# Patient Record
Sex: MALE | Race: WHITE | Employment: FULL TIME | URBAN - METROPOLITAN AREA
[De-identification: names, ages, dates, MRNs, and addresses within clinical notes are randomized per-mention and may not be internally consistent; named-entity substitution may affect disease eponyms.]

---

## 2018-02-12 ENCOUNTER — TRANSFERRED RECORDS (OUTPATIENT)
Dept: HEALTH INFORMATION MANAGEMENT | Facility: CLINIC | Age: 28
End: 2018-02-12

## 2018-02-27 ENCOUNTER — TRANSFERRED RECORDS (OUTPATIENT)
Dept: HEALTH INFORMATION MANAGEMENT | Facility: CLINIC | Age: 28
End: 2018-02-27

## 2018-05-07 ENCOUNTER — TRANSFERRED RECORDS (OUTPATIENT)
Dept: HEALTH INFORMATION MANAGEMENT | Facility: CLINIC | Age: 28
End: 2018-05-07

## 2018-05-15 ENCOUNTER — TRANSFERRED RECORDS (OUTPATIENT)
Dept: HEALTH INFORMATION MANAGEMENT | Facility: CLINIC | Age: 28
End: 2018-05-15
Payer: COMMERCIAL

## 2018-05-29 ENCOUNTER — TRANSFERRED RECORDS (OUTPATIENT)
Dept: HEALTH INFORMATION MANAGEMENT | Facility: CLINIC | Age: 28
End: 2018-05-29

## 2018-09-26 ENCOUNTER — TRANSFERRED RECORDS (OUTPATIENT)
Dept: HEALTH INFORMATION MANAGEMENT | Facility: CLINIC | Age: 28
End: 2018-09-26

## 2019-04-22 ENCOUNTER — TRANSFERRED RECORDS (OUTPATIENT)
Dept: HEALTH INFORMATION MANAGEMENT | Facility: CLINIC | Age: 29
End: 2019-04-22

## 2021-06-24 ENCOUNTER — TELEPHONE (OUTPATIENT)
Dept: AUDIOLOGY | Facility: CLINIC | Age: 31
End: 2021-06-24

## 2021-06-30 ENCOUNTER — MEDICAL CORRESPONDENCE (OUTPATIENT)
Dept: HEALTH INFORMATION MANAGEMENT | Facility: CLINIC | Age: 31
End: 2021-06-30

## 2021-07-01 ENCOUNTER — TRANSCRIBE ORDERS (OUTPATIENT)
Dept: OTHER | Age: 31
End: 2021-07-01

## 2021-07-01 DIAGNOSIS — Z96.21 USES COCHLEAR IMPLANT: ICD-10-CM

## 2021-07-01 DIAGNOSIS — H91.91 HEARING LOSS, RIGHT: Primary | ICD-10-CM

## 2021-07-02 ENCOUNTER — TELEPHONE (OUTPATIENT)
Dept: AUDIOLOGY | Facility: CLINIC | Age: 31
End: 2021-07-02
Payer: COMMERCIAL

## 2021-07-02 NOTE — TELEPHONE ENCOUNTER
[facility] Call Center    Phone Message    May a detailed message be left on voicemail: no     Reason for Call: Appointment Intake    Referring Provider Name: Jonathan Neumann MD at [facility]    Diagnosis and/or Symptoms: Continuation of care - needs cochlear implant follow-up. Patient is relocating to [place]. History of right-sided implantation in [place], [place]. Cochlear implant placed 2018 at [facility], [place]    Records in [facility]    Action Taken: Message routed to:  [facility] ([facility]): CLINIC COORDINATORS-[id number] [[id number]] - per protocols    Travel Screening: Not Applicable

## 2021-07-06 ENCOUNTER — TELEPHONE (OUTPATIENT)
Dept: AUDIOLOGY | Facility: CLINIC | Age: 31
End: 2021-07-06

## 2021-07-14 ENCOUNTER — TELEPHONE (OUTPATIENT)
Dept: AUDIOLOGY | Facility: CLINIC | Age: 31
End: 2021-07-14

## 2021-07-14 NOTE — TELEPHONE ENCOUNTER
Patient requesting to transfer CI care. Request for Medical records, surgery report, and cochlear Implant programing file faxed to [phone]- [facility], [place].

## 2021-07-27 ENCOUNTER — TELEPHONE (OUTPATIENT)
Dept: AUDIOLOGY | Facility: CLINIC | Age: 31
End: 2021-07-27

## 2021-08-05 ENCOUNTER — OFFICE VISIT (OUTPATIENT)
Dept: AUDIOLOGY | Facility: CLINIC | Age: 31
End: 2021-08-05
Payer: COMMERCIAL

## 2021-08-05 DIAGNOSIS — H90.3 SENSORINEURAL HEARING LOSS, ASYMMETRICAL: Primary | ICD-10-CM

## 2021-08-05 PROCEDURE — 92626 EVAL AUD FUNCJ 1ST HOUR: CPT | Performed by: AUDIOLOGIST

## 2021-08-05 PROCEDURE — V5014 HEARING AID REPAIR/MODIFYING: HCPCS | Performed by: AUDIOLOGIST

## 2021-08-05 PROCEDURE — 92567 TYMPANOMETRY: CPT | Mod: 52 | Performed by: AUDIOLOGIST

## 2021-08-05 NOTE — PROGRESS NOTES
AUDIOLOGY REPORT    BACKGROUND INFORMATION: Patient was implanted by Dr Placido Ball (at [facility]) with a  Cochlear Americas implant SN# [id number] on 5/16/18 in his right ear due to a bilateral sensorineural hearing loss and lack of benefit from hearing aids. The patient's initial activation on the right ear was 6/19/2018. He currently uses a Cochlear Americas N7 processor. He uses a left ReSound Linx Quattro hearing aid. He reports that he has used amplification continuously from about 3 years of age, he reports that he suffered from severe Jaundice shortly after birth which may be a factor in his hearing loss. He has a diagnosis of cerebral palsy.    The patient is being seen to establish care in this clinic for his cochlear implant on 8/5/2021 in the Audiology Clinic at the [facility].     A third year audiology student was present for today's appointment.    PATIENT REPORT: He reports that the embedded  for his left hearing aid is broke, he is currently using a back-up earmold but notes he is getting feedback.    He reports recent concerns of a possible left ear infection.    He has no concerns regarding his cochlear implant at this time. He would like to get the software updated in both is primary and back-up processor.    FITTING SESSION: Jonathan Neumann MD ordered today's appointment. The patient came to the clinic for adjustment to the programs in the external speech processor and for assessment of the external components of the cochlear implant system. These components provide power and data to the internal device. Sound is only heard once the external portion is activated. Postoperative treatment, including device fitting and adjustment, audiologic assessments, and training are required at regular intervals. Testing can include electropsychophysical measures of threshold, comfort, and loudness balancing, which are completed to update the  "program.    Processor type: Cochlear Americas N7  Magnet strength: Patient came in wearing #1, but changed to #1/2 per patient request as he reports #1 is too strong and he needs to \"undo\" the #1 magnet so it doesn't irritate his skin.    TEST RESULTS:   Otoscopy: Clear right ear canal, bloody drainage left ear canal.    Tympanograms: Normal mobility right, DNT left  Electrode Impedances: stable and within tolerances  Neural Response Testing: Present responses across the electrode array.  22 (178), 16 (176), 11 (193), 6 (188), 1 (148).  Facial Stimulation: Absent  Tinnitus: Absent  Balance Problems: Absent  Pain/Discomfort: Absent  Strategies Tried: ACE  Strategy Preference: ACE - Rate 720, PW 37    Programs: as read out from processor  1. Map 33 (SCAN)  2. Map 33 (standard antonio)  3. Map 33 (Music)  4. Map 34    Number of Channels per Program: 22    METHOD: Speech perception testing is conducted at regular intervals to determine the degree of benefit the patient is obtaining from the cochlear implant. Tests are conducted using the cochlear implant without the benefit of lipreading. All tests are conducted in a sound-treated room. Perception of monosyllabic words and words in sentences are tested. Results usually show improvement over time. A decrease in performance indicates the need for re-programming of the prosthesis and/or replacement of components.     Devices used for today's testing:Right Cochlear Americas N7 processor (Note that the left side with the Linx Quattro 7 hearing aid was not tested due to bloody drainage in the left ear)  Approximated 35 minutes of face to face contact was used to assess auditory rehabilitation status.     Aided Soundfield responses indicated responses in the normal to mild hearing loss range.     AzBio Sentences Test:  The patient repeats 20 sentences, auditory only.  The sentences are presented at 60 dB SPL (conversational level) delivered from a CD player.  Of note, a record " review was performed and there are no post-op scores for which to compare  Right CI: 84%      COMMENTS: The patient is receiving good audibility and speech understanding with his cochlear implant. No changes were made tot he programming of his cochlear implant today.    The patient's embedded  earmold is still under warranty and will be sent to the  for repair. Due to the bloody drainage in his left ear, it was recommended that he not wear the left hearing aid unless absolutely necessary. His current earmold was cleaned as it had become filled with blood. He expressed understanding.    The left hearing aid was connected to the software and read out. The feedback test was performed with the spare earmold per patient request. No additional changes were made to the programming today.    It was recommended that he follow-up with ENT regarding the left ear as soon as possible and an appointment was scheduled prior to him leaving the clinic today.    SUMMARY AND RECOMMENDATIONS: Vlad was seen for cochlear implant programming today and to establish care as a hearing aid and cochlear implant patient in this clinic. He will return in about 3 weeks to  his repaired  and perform bilateral aided testing.  The patient is scheduled to follow-up with ENT next week regarding his left ear. Please call this clinic with questions regarding these results or recommendations.      Stephanie Wolfe, Buddy  Audiologist  [place] License  #[id number]

## 2021-08-06 NOTE — TELEPHONE ENCOUNTER
FUTURE VISIT INFORMATION      FUTURE VISIT INFORMATION:    Date: 2021    Time: 8:30AM    Location: [facility]  REFERRAL INFORMATION:    Referring provider:      Referring providers clinic:      Reason for visit/diagnosis  Non CI ear- blood drainage in left ear.     RECORDS REQUESTED FROM:       Clinic name Comments Records Status Imaging Status   [facility] [place] 2021 audio and note from Stephanie [facility]win [facility]    ENT [facility]   2021 note from Jonathan Neumann MD Care everywhere     [facility] [place]    [address] [place] [id number] [phone]   5/15/2018 COCHLEAR IMPLANT with Placido Ball MD   Care everywhere     [place] [facility]    [address] [place] [id number] [phone] 2018 note from Placido Ball MD   Care everywhere      Medicine imaging   images (fx. [phone]), 3/1/2018 CT Temporal Bone   2014 CT head    *trackin   Care everywhere  req 21, 8/10/21, 21 sent to [facility] Information Management  Release of Information  [address] [place] [address], [place] [address]  Phone: [phone]  Fax: [phone]    2021 8AM sent a fax to [facility] for images - Amay   8/10/2021 2:44PM tracked images, have not been sent. Sent a 2nd fax for images - Amay   2021 11:51AM left a voicemail with medical records, called hospital. Connected with radiology film room, film room provided direct fax for images (fx. [phone]), sent fax for images - Amay    * 21 1:39 PM Imaging disc received [facility] and sent to [facility] to be uploaded into PACs. - [place]

## 2021-08-16 NOTE — PATIENT INSTRUCTIONS
1. You were seen in the ENT Clinic today by Dr. Nissen.  If you have any questions or concerns after your appointment, please call   - Option 1: ENT Clinic: [phone]   - Option 2: Gabriela (Dr. Nissen's Nurse): [phone]                   Shannon(Dr. Nissen's Nurse): [phone]      2.   Plan to return to clinic in 1 year with Hearing test    Gabriela Hammonds LPN  [facility] - Otolaryngology

## 2021-08-17 ENCOUNTER — OFFICE VISIT (OUTPATIENT)
Dept: OTOLARYNGOLOGY | Facility: CLINIC | Age: 31
End: 2021-08-17
Payer: COMMERCIAL

## 2021-08-17 ENCOUNTER — PRE VISIT (OUTPATIENT)
Dept: OTOLARYNGOLOGY | Facility: CLINIC | Age: 31
End: 2021-08-17

## 2021-08-17 VITALS — HEART RATE: 90 BPM | TEMPERATURE: 97.8 F | WEIGHT: 134.48 LBS | OXYGEN SATURATION: 97 %

## 2021-08-17 DIAGNOSIS — H92.12 DRAINAGE FROM LEFT EAR: ICD-10-CM

## 2021-08-17 DIAGNOSIS — H90.3 BILATERAL SENSORINEURAL HEARING LOSS: Primary | ICD-10-CM

## 2021-08-17 PROCEDURE — 92504 EAR MICROSCOPY EXAMINATION: CPT | Performed by: OTOLARYNGOLOGY

## 2021-08-17 PROCEDURE — 99203 OFFICE O/P NEW LOW 30 MIN: CPT | Mod: 25 | Performed by: OTOLARYNGOLOGY

## 2021-08-17 RX ORDER — SUMATRIPTAN 25 MG/1
TABLET, FILM COATED ORAL
COMMUNITY
Start: 2021-06-11

## 2021-08-17 ASSESSMENT — PAIN SCALES - GENERAL: PAINLEVEL: NO PAIN (0)

## 2021-08-17 NOTE — LETTER
8/17/2021       RE: Vlad Rhoades  [address]  [facility] [address]     Dear Colleague,    Thank you for referring your patient, Vlad Rhoades, to the [facility] NOSE [facility]AT [facility]INIC [place] [facility]. Please see a copy of my visit note below.    Dear Dr. Gotti Ref-Primary, Physician:    I had the pleasure of meeting Vlad Rhoades in consultation today at the [facility] Clinic at your request.    CHIEF COMPLAINT: Ears    HISTORY OF PRESENT ILLNESS: Patient is a 31-year-old in today for initial visit and establishing care.  He has cerebral palsy and severe bilateral sensorineural hearing loss.  He wears a hearing aid in the left ear and has a cochlear implant in the right ear that was placed while living in [place].  He recently moved here from [place] where his parents live.  He is working remotely in [place] and leaving now in [place], establishing care here.  He recently had drainage from the left ear, he said blood-tinged.  He was treated in [place] and has moved here, comes in for follow-up.  No actual pain, has not noticed actual drainage from the ear.  He gets along well with the implant and hearing aid.  No dizziness.  Denies any dysphagia, hoarseness, facial paresthesias.  Has tinnitus but not problematic.    ALLERGIES:    Allergies   Allergen Reactions     Other Environmental Allergy Itching and Other (See Comments)     Other reaction(s): Wheezing/Bronchospasm (High)  Other reaction(s): Wheezing/Bronchospasm (High)     Seasonal Allergies Itching and Other (See Comments)     Other reaction(s): Wheezing/Bronchospasm (High)       HABITS: Social History    Substance and Sexual Activity      Alcohol use: Not on file        Comment: less than 1 drink per week     History   Smoking Status     Never Smoker   Smokeless Tobacco     Never Used         PAST MEDICAL HISTORY: Please see today's intake form (for the  remainder of the PMH) which I reviewed and signed.  History reviewed. No pertinent past medical history.    FAMILY HISTORY/SOCIAL HISTORY: History reviewed. No pertinent family history.   Social History     Socioeconomic History     Marital status: Single     Spouse name: Not on file     Number of children: Not on file     Years of education: Not on file     Highest education level: Not on file   Occupational History     Not on file   Tobacco Use     Smoking status: Never Smoker     Smokeless tobacco: Never Used   Substance and Sexual Activity     Alcohol use: Not on file     Comment: less than 1 drink per week     Drug use: Not on file     Sexual activity: Not on file   Other Topics Concern     Not on file   Social History Narrative     Not on file     Social Determinants of Health     Financial Resource Strain:      Difficulty of Paying Living Expenses:    Food Insecurity:      Worried About Running Out of Food in the Last Year:      Ran Out of Food in the Last Year:    Transportation Needs:      Lack of Transportation (Medical):      Lack of Transportation (Non-Medical):    Physical Activity:      Days of Exercise per Week:      Minutes of Exercise per Session:    Stress:      Feeling of Stress :    Social Connections:      Frequency of Communication with Friends and Family:      Frequency of Social Gatherings with Friends and Family:      Attends Rastafarian Services:      Active Member of Clubs or Organizations:      Attends Club or Organization Meetings:      Marital Status:    Intimate Partner Violence:      Fear of Current or Ex-Partner:      Emotionally Abused:      Physically Abused:      Sexually Abused:        REVIEW OF SYSTEMS: [unfilled]    The remainder of the 10 point ROS is negative    PHYSICIAL EXAMINATION:  Constitutional: The patient was well-groomed and in no acute distress.   Skin: Warm and pink.  Psychiatric: The patient's affect was calm, cooperative, and appropriate.   Respiratory: Breathing  comfortably without stridor or exertion of accessory muscles.  Eyes: Pupils were equal and reactive. Extraocular movement intact.   Head: Normocephalic and atraumatic. No lesions or scars.  Ears: Both ears examined does have mild drainage on the left side.  Is taken the microscope and under high-power magnification the right side was cleaned with curettes of cerumen.  Following cleaning, tympanic membrane and middle ear look normal.  Cochlear implant site looks well-healed and stable.  Left ear had mild debris which was cleaned with curette and suction.  He has a blood clot on the inferior canal.  Also has a small skin tag anterior lateral canal.  Not problematic palpation.  Nose: Sinuses were nontender. Anterior rhinoscopy revealed midline septum and absence of purulence or polyps.  Oral Cavity: Normal tongue, floor of mouth, buccal mucosa, and palate. No lesions or masses on inspection or palpation. No abnormal lymph tissue in the oropharynx.   Neck: The parotid is soft without masses. Supple with normal laryngeal and tracheal landmarks.   Lymphatic: There is no palpable lymphadenopathy or other masses in the neck.   Neurologic: Alert and oriented x 3. Cranial nerves III-XI within normal limits. Voice quality normal.  Cerebellar Function Tests:  Grossly normal    Audiogram: Audiogram performed with implant shows good response at about 30 dB through all frequencies.      IMPRESSION AND PLAN:   1. Bilateral sensorineural hearing loss: Continue to maximize benefit from cochlear implant on the right hearing aid on the left.  2. Left ear drainage: Essentially cleared, blood clot on anterior canal I left alone does not seem to be bothering, also monitor small Skin tag anterior canal.  Recommend follow-up in a year, sooner if any concerns    Thank you very much for the opportunity to participate in the care of your patient.    Rick L Nissen MD

## 2021-08-17 NOTE — NURSING NOTE
Chief Complaint   Patient presents with     Consult     blood drainge left ear    Pulse 90, temperature 97.8  F (36.6  C), weight 61 kg (134 lb 7.7 oz), SpO2 97 %.      Joe Ortega LPN

## 2021-08-17 NOTE — PROGRESS NOTES
Dear Dr. Gotti Ref-Primary, Physician:    I had the pleasure of meeting Vlad Rhoades in consultation today at the [facility] at your request.    CHIEF COMPLAINT: Ears    HISTORY OF PRESENT ILLNESS: Patient is a 31-year-old in today for initial visit and establishing care.  He has cerebral palsy and severe bilateral sensorineural hearing loss.  He wears a hearing aid in the left ear and has a cochlear implant in the right ear that was placed while living in [place].  He recently moved here from [place] where his parents live.  He is working remotely in [place] and leaving now in [place], establishing care here.  He recently had drainage from the left ear, he said blood-tinged.  He was treated in [place] and has moved here, comes in for follow-up.  No actual pain, has not noticed actual drainage from the ear.  He gets along well with the implant and hearing aid.  No dizziness.  Denies any dysphagia, hoarseness, facial paresthesias.  Has tinnitus but not problematic.    ALLERGIES:    Allergies   Allergen Reactions     Other Environmental Allergy Itching and Other (See Comments)     Other reaction(s): Wheezing/Bronchospasm (High)  Other reaction(s): Wheezing/Bronchospasm (High)     Seasonal Allergies Itching and Other (See Comments)     Other reaction(s): Wheezing/Bronchospasm (High)       HABITS: Social History    Substance and Sexual Activity      Alcohol use: Not on file        Comment: less than 1 drink per week     History   Smoking Status     Never Smoker   Smokeless Tobacco     Never Used         PAST MEDICAL HISTORY: Please see today's intake form (for the remainder of the PMH) which I reviewed and signed.  History reviewed. No pertinent past medical history.    FAMILY HISTORY/SOCIAL HISTORY: History reviewed. No pertinent family history.   Social History     Socioeconomic History     Marital status: Single     Spouse name: Not on file     Number of children: Not on file      Years of education: Not on file     Highest education level: Not on file   Occupational History     Not on file   Tobacco Use     Smoking status: Never Smoker     Smokeless tobacco: Never Used   Substance and Sexual Activity     Alcohol use: Not on file     Comment: less than 1 drink per week     Drug use: Not on file     Sexual activity: Not on file   Other Topics Concern     Not on file   Social History Narrative     Not on file     Social Determinants of Health     Financial Resource Strain:      Difficulty of Paying Living Expenses:    Food Insecurity:      Worried About Running Out of Food in the Last Year:      Ran Out of Food in the Last Year:    Transportation Needs:      Lack of Transportation (Medical):      Lack of Transportation (Non-Medical):    Physical Activity:      Days of Exercise per Week:      Minutes of Exercise per Session:    Stress:      Feeling of Stress :    Social Connections:      Frequency of Communication with Friends and Family:      Frequency of Social Gatherings with Friends and Family:      Attends Druze Services:      Active Member of Clubs or Organizations:      Attends Club or Organization Meetings:      Marital Status:    Intimate Partner Violence:      Fear of Current or Ex-Partner:      Emotionally Abused:      Physically Abused:      Sexually Abused:        REVIEW OF SYSTEMS: [unfilled]    The remainder of the 10 point ROS is negative    PHYSICIAL EXAMINATION:  Constitutional: The patient was well-groomed and in no acute distress.   Skin: Warm and pink.  Psychiatric: The patient's affect was calm, cooperative, and appropriate.   Respiratory: Breathing comfortably without stridor or exertion of accessory muscles.  Eyes: Pupils were equal and reactive. Extraocular movement intact.   Head: Normocephalic and atraumatic. No lesions or scars.  Ears: Both ears examined does have mild drainage on the left side.  Is taken the microscope and under high-power magnification the right  side was cleaned with curettes of cerumen.  Following cleaning, tympanic membrane and middle ear look normal.  Cochlear implant site looks well-healed and stable.  Left ear had mild debris which was cleaned with curette and suction.  He has a blood clot on the inferior canal.  Also has a small skin tag anterior lateral canal.  Not problematic palpation.  Nose: Sinuses were nontender. Anterior rhinoscopy revealed midline septum and absence of purulence or polyps.  Oral Cavity: Normal tongue, floor of mouth, buccal mucosa, and palate. No lesions or masses on inspection or palpation. No abnormal lymph tissue in the oropharynx.   Neck: The parotid is soft without masses. Supple with normal laryngeal and tracheal landmarks.   Lymphatic: There is no palpable lymphadenopathy or other masses in the neck.   Neurologic: Alert and oriented x 3. Cranial nerves III-XI within normal limits. Voice quality normal.  Cerebellar Function Tests:  Grossly normal    Audiogram: Audiogram performed with implant shows good response at about 30 dB through all frequencies.      IMPRESSION AND PLAN:   1. Bilateral sensorineural hearing loss: Continue to maximize benefit from cochlear implant on the right hearing aid on the left.  2. Left ear drainage: Essentially cleared, blood clot on anterior canal I left alone does not seem to be bothering, also monitor small Skin tag anterior canal.  Recommend follow-up in a year, sooner if any concerns    Thank you very much for the opportunity to participate in the care of your patient.    Rick L Nissen MD

## 2021-08-22 ENCOUNTER — HEALTH MAINTENANCE LETTER (OUTPATIENT)
Age: 31
End: 2021-08-22

## 2021-08-27 ENCOUNTER — OFFICE VISIT (OUTPATIENT)
Dept: AUDIOLOGY | Facility: CLINIC | Age: 31
End: 2021-08-27
Payer: COMMERCIAL

## 2021-08-27 DIAGNOSIS — H90.3 SENSORINEURAL HEARING LOSS, ASYMMETRICAL: Primary | ICD-10-CM

## 2021-08-27 PROCEDURE — 92557 COMPREHENSIVE HEARING TEST: CPT | Mod: 52 | Performed by: AUDIOLOGIST

## 2021-08-27 PROCEDURE — 92567 TYMPANOMETRY: CPT | Performed by: AUDIOLOGIST

## 2021-08-27 PROCEDURE — 92592 PR HEARING AID CHECK, MONAURAL: CPT | Performed by: AUDIOLOGIST

## 2021-08-27 NOTE — PROGRESS NOTES
AUDIOLOGY REPORT    BACKGROUND INFORMATION: Vlad Rhoades was seen in the Audiology Clinic at [facility] on 8/27/2021 for follow-up.  Patient was implanted by Dr Placido Ball (at [facility]) with a  Cochlear Americas implant SN# [id number] on 5/16/18 in his right ear due to a bilateral sensorineural hearing loss and lack of benefit from hearing aids. The patient's initial activation on the right ear was 6/19/2018. He currently uses a Cochlear Americas N7 processor. He uses a left ReSound Linx Quattro hearing aid. He reports that he has used amplification continuously from about 3 years of age, he reports that he suffered from severe Jaundice shortly after birth which may be a factor in his hearing loss. He has a diagnosis of cerebral palsy.    He was previously seen to establish care here for his cochlear implant on 8/5/2021. At that time his left earmold was found to be broken, he has been using a back up, and so the left earmold was sent in for repair. He is here today to  that earmold. Patient requests hearing evaluation as well.    TEST RESULTS AND PROCEDURES:   Otoscopic exam indicates ears are clear of cerumen bilaterally     Pure Tone Thresholds assessed using conventional audiometry with good  reliability from 250-8000 Hz bilaterally using insert earphones and circumaural headphones     RIGHT: Profound sensorineural hearing loss (no response at equipment limits)    LEFT:    moderate sloping to profound sensorineural hearing loss    Tympanogram:    RIGHT: Shallow mobility    LEFT:   Shallow mobility    Speech Reception Threshold:    RIGHT: Did not test    LEFT:   75 dB HL    Word Recognition Score:     RIGHT: Did not test    LEFT:   76% at 100 dB HL using NU-6 recorded word list.    The repaired embedded earmold was given to the patient, he did not want to change the earmolds today as he reports it is the exact same as his back-up (he had two made as he  reports he needs them repaired frequently.)  The hearing aid was checked, an electroacoustic check indicated that the device was functioning appropriately with no distortion or weakness noted. Simulated real-ear measures were performed and the hearing aid was found to fairly close to NAL-NL1 target gain. He reported he isn't hearing high frequencies as well with the hearing aid compared to his cochlear implant so gain in the high frequencies was increased, he also requested gain in the mid frequencies increased as well. Compression put on to help with the high frequencies but he did not like the sound quality so it was removed.    It was discussed that he will be hearing high frequencies better with his implant compared to the hearing aid which is expected given his hearing thresholds. He expressed understanding.  He had questions regarding a cochlear implant in the left ear, given his word recognition scores in the left ear it would be recommended he continue with hearing aid use.    SUMMARY AND RECOMMENDATIONS: A hearing aid follow-up and hearing test was performed today.  Adjustments were made as noted above and the patient will return annually for testing, sooner if concerns arise.  Call this clinic with questions regarding today s visit.      Buddy Louise  Audiologist  [place] License  #[id number]

## 2021-09-20 ENCOUNTER — TELEPHONE (OUTPATIENT)
Dept: AUDIOLOGY | Facility: CLINIC | Age: 31
End: 2021-09-20

## 2021-09-20 NOTE — TELEPHONE ENCOUNTER
Walk-in hearing aid services on 9/20/21: The patient stated his left ReSound embedded HAI mold was not functioning well.  It is being sent to the  for warranty (?) repair today.  The repaired mold will be mailed to the patient when it returns.  Mr. Rhoades was provided with a package of  filters and a new desiccant jar.  He was advised to return as needed.

## 2021-09-28 ENCOUNTER — TELEPHONE (OUTPATIENT)
Dept: AUDIOLOGY | Facility: CLINIC | Age: 31
End: 2021-09-28

## 2021-09-28 NOTE — TELEPHONE ENCOUNTER
The patient's repaired, left ReSound embedded HAI earmold (#[id number]) was mailed back to the patient today.  There is no charge as the device was under warranty.

## 2021-10-17 ENCOUNTER — HEALTH MAINTENANCE LETTER (OUTPATIENT)
Age: 31
End: 2021-10-17

## 2021-10-18 ENCOUNTER — MYC MEDICAL ADVICE (OUTPATIENT)
Dept: AUDIOLOGY | Facility: CLINIC | Age: 31
End: 2021-10-18

## 2021-10-20 NOTE — TELEPHONE ENCOUNTER
Alfred Chu,    I am sorry to hear that the microphone is malfunctioning. Unfortunately I do not have popexpert ReSound RITE devices at the moment and so you may just need to use the cochlear implant on its on for the few weeks that the device is in for repair.    You do not need an appointment to drop it off, you can just bring it to the clinic at your convenience and we can get it sent in for you.    Regards,  Stephanie Amado

## 2021-11-10 ENCOUNTER — OFFICE VISIT (OUTPATIENT)
Dept: AUDIOLOGY | Facility: CLINIC | Age: 31
End: 2021-11-10
Payer: COMMERCIAL

## 2021-11-10 DIAGNOSIS — H90.3 SENSORINEURAL HEARING LOSS, ASYMMETRICAL: Primary | ICD-10-CM

## 2021-11-10 PROCEDURE — 99207 PR ASSESSMENT FOR HEARING AID: CPT | Performed by: AUDIOLOGIST

## 2021-11-10 NOTE — PROGRESS NOTES
AUDIOLOGY REPORT    BACKGROUND INFORMATION: Vlad Rhoades was seen in the Audiology Clinic at [facility] on 11/10/2021 for follow-up.  Patient was implanted by Dr Placido Ball (at [facility]) with a  Cochlear [employer] implant SN# [id number] on 5/16/18 in his right ear due to a bilateral sensorineural hearing loss and lack of benefit from hearing aids. The patient's initial activation on the right ear was 6/19/2018. He currently uses a Cochlear [employer] N7 processor. He uses a left ReSound Linx Quattro hearing aid. He reports that he has used amplification continuously from about 3 years of age, he reports that he suffered from severe Jaundice shortly after birth which may be a factor in his hearing loss. He has a diagnosis of cerebral palsy.    He is here today as he reports excessive feedback from his hearing aid and to have everything re-paired so he can stream bimodally again.    TEST RESULTS AND PROCEDURES:   The hearing aid was connected to the software and the feedback manager was performed. The vent of the patient's earmold needed to be cleaned out as he had put a stick substance in the vent to stop the feedback. After the feedback manager was performed and the vent was cleaned he reported that the feedback issue was solved.    The hearing aid was linked to the patient's N7 processors. After connecting things, the hearing aid stopped producing sound. The hearing aid was restarted multiple times.  An attempt was made to reconnect the hearing aid to the software but it would not connect the software.  The hearing aid was unlinked from the cochlear implant sound processors and then tried to be connected back to the software but it kept reading out an error. A call to ReSound indicated that the hearing aid could be re-set. An attempt was made to re-set the hearing aid to factory settings but it would not connect to be able to do so.    It was confirmed with [employer] and ReSound that the correct software was installed with all the updates.    After 45 minutes of trying to re-set the hearing aid and get it connected,it was decided to re-send the device in for repair and just try again with a new device. The patient was in agreement with the plan.    The patient has a back-up Oticon hearing aid to use while the ReSound device is out for repair. The tubing was changed on the earmold for the patient to use.    SUMMARY AND RECOMMENDATIONS: A hearing aid check was performed today. The proper adjustments could not be completed due to technology errors with the software. The patient will return for an appointment on 11/19/21 to get everything re-paired and programmed correctly. Today's appointment is a no charge visit as he will be billed for the hearing aid check the day that he can leave with the hearing aid. Call this clinic with questions regarding today s visit.      Buddy Louise  Audiologist  [place] License  #[id number]

## 2021-11-19 ENCOUNTER — OFFICE VISIT (OUTPATIENT)
Dept: AUDIOLOGY | Facility: CLINIC | Age: 31
End: 2021-11-19
Payer: COMMERCIAL

## 2021-11-19 DIAGNOSIS — H90.3 SENSORINEURAL HEARING LOSS, ASYMMETRICAL: Primary | ICD-10-CM

## 2021-11-19 PROCEDURE — 99207 PR ASSESSMENT FOR HEARING AID: CPT | Performed by: AUDIOLOGIST

## 2021-11-19 NOTE — PROGRESS NOTES
AUDIOLOGY REPORT    BACKGROUND INFORMATION: Vlad Rhoades was seen in the Audiology Clinic at [facility] on 11/19/2021 for follow-up.  Patient was implanted by Dr Placido Ball (at [facility]) with a  Cochlear [employer] implant SN# [id number] on 5/16/18 in his right ear due to a bilateral sensorineural hearing loss and lack of benefit from hearing aids. The patient's initial activation on the right ear was 6/19/2018. He currently uses a Cochlear [employer] N7 processor. He uses a left ReSound Linx Quattro hearing aid. He reports that he has used amplification continuously from about 3 years of age, he reports that he suffered from severe Jaundice shortly after birth which may be a factor in his hearing loss. He has a diagnosis of cerebral palsy.    He is here today to have everything re-paired so he can stream bimodally again. Vlad was seen on 11/10/2021 and upon linking the hearing aid to the patient's N7 processors, the hearing aid stopped working. The hearing aid was sent in for repair. Today the repaired hearing aid will be re-paired to the patient's N7 processors.     TEST RESULTS AND PROCEDURES:   The repaired Resound hearing aid was connected to the fitting software and most recent settings were imported.     Pairing in the Cochlear software to pair the hearing aid and the N7 processor was attempted. However, when the hearing aid linked to the N7 processor in the software, the hearing aid stopped functioning again.     Cochlear [employer] was called and they reported this is a known issue with the Resound Quattro hearing aids where once the hearing aid is linked in the software, the circuit will mute the hearing aid. Once the hearing aid has been muted it cannot be unmuted without being sent in to the .     The hearing aid will be sent to Resound to repaired and for a firmware update inside the hearing aid.     SUMMARY AND RECOMMENDATIONS: The Resound  hearing aid will be sent in for repair again. The patient will be called to return to the clinic once the device is back from repair. Pairing will be re-attempted at this time. Today's appointment is a no charge visit.   Call this clinic with questions regarding today s visit.    Tonja Blevins M.A.   Audiology Doctoral Student #[id number]        I was present with the patient for the entire Audiology appointment including all procedures/testing performed by the AuD student, and agree with the student s assessment and plan as documented.      Stephanie Wolfe, Buddy  Audiologist  [place] License  #[id number]

## 2021-12-07 NOTE — PROGRESS NOTES
AUDIOLOGY REPORT    BACKGROUND INFORMATION: Vlad Rhoades was seen in the Audiology Clinic at [facility] on 12/8/2021 for follow-up.  Patient was implanted by Dr Placido Ball (at [facility]) with a  Cochlear [employer] implant SN# [id number] on 5/16/18 in his right ear due to a bilateral sensorineural hearing loss and lack of benefit from hearing aids. The patient's initial activation on the right ear was 6/19/2018. He currently uses a Cochlear [employer]s N7 processor. He uses a left ReSound Linx Quattro hearing aid. He reports that he has used amplification continuously from about 3 years of age, he reports that he suffered from severe Jaundice shortly after birth which may be a factor in his hearing loss. He has a diagnosis of cerebral palsy.    He is here today to have everything re-paired so he can stream bimodally again. Vlad was seen on 11/10/2021 and on 11/19/2021. At both visits upon linking the hearing aid to the patient's N7 processors, the hearing aid stopped working. Cochlear [employer]s was called and they reported this is a known issue with the Resound Quattro hearing aids where once the hearing aid is linked in the software, the circuit will mute the hearing aid.      TEST RESULTS AND PROCEDURES:   The repaired Resound hearing aid was connected to the fitting software and the firmware was upgraded  and most recent settings were imported.     Pairing in the Cochlear software to pair the hearing aid and the N7 processor was successful.    Feedback was noted when Vlad put on the left hearing aid. Feedback manager was re-run and following that feedback was not noted.     SUMMARY AND RECOMMENDATIONS: The patient's Resound Linx Quattro was successfully paired to his N7 processors. Today's appointment is a no charge visit.  Call this clinic with questions regarding today s visit.    Tonja Blevins M.A.   Audiology Doctoral Student #[id number]      I was  present with the patient for the entire Audiology appointment including all procedures/testing performed by the AuD student, and agree with the student s assessment and plan as documented.      Stephanie Wolfe, Buddy  Audiologist  [place] License  #[id number]

## 2021-12-08 ENCOUNTER — OFFICE VISIT (OUTPATIENT)
Dept: AUDIOLOGY | Facility: CLINIC | Age: 31
End: 2021-12-08
Payer: COMMERCIAL

## 2021-12-08 DIAGNOSIS — H90.3 SENSORINEURAL HEARING LOSS, ASYMMETRICAL: Primary | ICD-10-CM

## 2021-12-08 PROCEDURE — 99207 PR ASSESSMENT FOR HEARING AID: CPT | Performed by: AUDIOLOGIST

## 2022-01-24 ENCOUNTER — TELEPHONE (OUTPATIENT)
Dept: AUDIOLOGY | Facility: CLINIC | Age: 32
End: 2022-01-24
Payer: COMMERCIAL

## 2022-03-17 ENCOUNTER — TRANSFERRED RECORDS (OUTPATIENT)
Dept: HEALTH INFORMATION MANAGEMENT | Facility: CLINIC | Age: 32
End: 2022-03-17
Payer: COMMERCIAL

## 2022-03-18 ENCOUNTER — OFFICE VISIT (OUTPATIENT)
Dept: AUDIOLOGY | Facility: CLINIC | Age: 32
End: 2022-03-18
Payer: COMMERCIAL

## 2022-03-18 DIAGNOSIS — H90.3 SENSORINEURAL HEARING LOSS, ASYMMETRICAL: Primary | ICD-10-CM

## 2022-03-18 PROCEDURE — 99207 PR ASSESSMENT FOR HEARING AID: CPT | Performed by: AUDIOLOGIST

## 2022-03-18 NOTE — PROGRESS NOTES
AUDIOLOGY REPORT    BACKGROUND INFORMATION: Vlad Rhoades was seen in the Audiology Clinic at [facility] on 3/18/2022 for follow-up.  Patient was implanted by Dr Placido Ball (at [facility]) with a  Cochlear [employer] implant SN# [id number] on 5/16/18 in his right ear due to a bilateral sensorineural hearing loss and lack of benefit from hearing aids. The patient's initial activation on the right ear was 6/19/2018. He currently uses a Cochlear [employer] N7 processor. He uses a left ReSound Linx Quattro hearing aid. He reports that he has used amplification continuously from about 3 years of age, he reports that he suffered from severe Jaundice shortly after birth which may be a factor in his hearing loss. He has a diagnosis of cerebral palsy.    His hearing aid recently returned from repair and so he is here today to pair the hearing aid to the N7 processor.    TEST RESULTS AND PROCEDURES:   The hearing aid was programmed to the latest settings in the software. The Cochlear N7 processor was then connect to the software and the hearing aid was linked to the processor successfully.    He expressed that he would be interested in obtaining a new ReSound hearing aid in about 6 months as he heard that they will be releasing new technology around that time and his current hearing aid warranty will be expiring sometime this year.      He also had questions regarding testing of the cochlear implant. It was reviewed that he would be due for annual testing in August and so he would like to accomplish those appointments at that time.    SUMMARY AND RECOMMENDATIONS: A hearing aid follow-up was performed today.  Adjustments were made as noted above and the patient will return for annual cochlear implant testing in August 2022 and a hearing aid consultation at that time.  These appointments were scheduled prior to him leaving the clinic today. He is welcome to return sooner if he has  problems or concerns. Today's appointment is a no charge visit as no billable service was provided.  Call this clinic with questions regarding today s visit.    Buddy Louise  Audiologist  [place] License  #[id number]

## 2022-04-19 ENCOUNTER — TELEPHONE (OUTPATIENT)
Dept: OTOLARYNGOLOGY | Facility: CLINIC | Age: 32
End: 2022-04-19
Payer: COMMERCIAL

## 2022-04-19 NOTE — TELEPHONE ENCOUNTER
Left msg to get pt in for a removal of a cotton from a q-tip. Could come in today at 2:30 pm and see Olive or Dr. Nissen (who ever is available at that time). Gave direct number for call back.    Gabriela Hammonds LPN

## 2022-04-20 NOTE — TELEPHONE ENCOUNTER
Per [facility]where pt was seen in [facility] on 4/18 where they removed the qtip.    Gabriela Hammonds LPN

## 2022-05-09 ENCOUNTER — TELEPHONE (OUTPATIENT)
Dept: AUDIOLOGY | Facility: CLINIC | Age: 32
End: 2022-05-09
Payer: COMMERCIAL

## 2022-05-09 NOTE — TELEPHONE ENCOUNTER
Walk-in hearing aid services on 5/9/22: The patient asked to have his left hearing aid and earmold cleaned.  Both were cleaned today and the  filter on the earmold was replaced.  The hearing aid was found to be working well.  The patient was provided with a pack of filter and cleaning threads for the earmold vent today.

## 2022-06-15 ENCOUNTER — TELEPHONE (OUTPATIENT)
Dept: OTOLARYNGOLOGY | Facility: CLINIC | Age: 32
End: 2022-06-15
Payer: COMMERCIAL

## 2022-06-17 ENCOUNTER — TELEPHONE (OUTPATIENT)
Dept: AUDIOLOGY | Facility: CLINIC | Age: 32
End: 2022-06-17
Payer: COMMERCIAL

## 2022-06-19 NOTE — TELEPHONE ENCOUNTER
Walk-in hearing aid services on 6/17/22: The patient asked to have his left hearing aid and earmold cleaned.  Both were cleaned today and the  filter on the earmold was replaced.  The hearing aid was found to be working well.  The hearing aid was returned to the patient and he was provided with a pack of filters.

## 2022-08-17 ENCOUNTER — OFFICE VISIT (OUTPATIENT)
Dept: AUDIOLOGY | Facility: CLINIC | Age: 32
End: 2022-08-17
Payer: COMMERCIAL

## 2022-08-17 DIAGNOSIS — H90.3 SENSORINEURAL HEARING LOSS, BILATERAL: Primary | ICD-10-CM

## 2022-08-17 PROCEDURE — 92567 TYMPANOMETRY: CPT | Mod: 59 | Performed by: AUDIOLOGIST

## 2022-08-17 PROCEDURE — 92626 EVAL AUD FUNCJ 1ST HOUR: CPT | Mod: 59 | Performed by: AUDIOLOGIST

## 2022-08-17 PROCEDURE — 92604 REPROGRAM COCHLEAR IMPLT 7/>: CPT | Mod: 52 | Performed by: AUDIOLOGIST

## 2022-08-18 NOTE — PROGRESS NOTES
AUDIOLOGY REPORT    BACKGROUND INFORMATION: Patient was implanted by Dr Placido Ball (at [facility]) with a  Cochlear Americas implant SN# [id number] on 5/16/18 in his right ear due to a bilateral sensorineural hearing loss and lack of benefit from hearing aids. The patient's initial activation on the right ear was 6/19/2018. He currently uses a Cochlear [employer] N7 processor. He uses a left ReSound Linx Quattro hearing aid. He reports that he has used amplification continuously from about 3 years of age, he reports that he suffered from severe Jaundice shortly after birth which may be a factor in his hearing loss. He has a diagnosis of cerebral palsy.    The patient is being seen for annual speech perception testing and programming and to discuss a new hearing aid in his left ear.    PATIENT REPORT: He reports that his Resound hearing aid is not functioning. Visual inspection showed the  was loose, however he reported it is not a speaker issue but a microphone issue. I was unable to hear any distortion or intermittency in clinic, however he is still using his backup BTE hearing aid and did not want to send the hearing aid in for repair. He reported he is doing well with the cochlear implant.    METHOD: Speech perception testing is conducted at regular intervals to determine the degree of benefit the patient is obtaining from the cochlear implant. Tests are conducted using the cochlear implant without the benefit of lipreading. All tests are conducted in a sound-treated room. Perception of monosyllabic words and words in sentences are tested. Results usually show improvement over time. A decrease in performance indicates the need for re-programming of the prosthesis and/or replacement of components.     INTERVAL: 3 years     35 minutes were spent assessing the patient s auditory rehabilitation status.    Device(s) used for Testing:   Right ear: N7  Left ear: [employer] BTE    Soundfield Aided Thresholds: Aided  thresholds in borderline normal/mild hearing loss range  Unaided Thresholds: Moderate sloping to profound sensorineural hearing loss in the left ear. Stable. Did not test right ear due to documented complete loss.  Tympanograms: Restricted right, normal left.    CNC Words Test:  The patient repeats 25 single syllable words, auditory only. The words are presented at 60 dB SPL (conversational level) delivered from a CD player.    3 years Post-Activation of CI: (today)  Right: words: 72%, phonemes: 85%    AzBio Sentences Test:  The patient repeats 20 sentences, auditory only.  The sentences are presented at 60 dB SPL (conversational level) delivered from a CD player.     2 years Post-Activation of CI: (8/5/21)  Right: 84%    3 years Post-Activation of CI: (today)  Right: 80%  Bimodal: 96%, +10 dB SNR: 63%      FITTING SESSION: Rick Nissen, MD ordered today's appointment. The patient came to the clinic for adjustment to the programs in the external speech processor and for assessment of the external components of the cochlear implant system. These components provide power and data to the internal device. Sound is only heard once the external portion is activated. Postoperative treatment, including device fitting and adjustment, audiologic assessments, and training are required at regular intervals. Testing can include electropsychophysical measures of threshold, comfort, and loudness balancing, which are completed to update the program.    Processor type: Cochlear [employer] N7  Magnet strength: 1/2    TEST RESULTS:   Otoscopy: Clear canals bilaterally    Electrode Impedances: stable and within tolerances  Neural Response Testing: Present responses across the electrode array.  22 (178), 16 (176), 11 (193), 6 (188), 1 (148).  Facial Stimulation: Absent  Tinnitus: Absent  Balance Problems: Absent  Pain/Discomfort: Absent  Strategies Tried: ACE  Strategy Preference: ACE - Rate 720, PW 37    Programs: as read out from  processor  1. Map 33 (SCAN)  2. Map 33 (standard antonio)  3. Map 33 (Music)  4. Map 34    Number of Channels per Program: 22    Patient is a hearing aid candidate. Patient would like to move forward with a hearing aid evaluation today. Therefore, the patient was presented with different options for amplification to help aid in communication. Discussed styles, levels of technology and monaural vs. binaural fitting.     The hearing aid(s) mutually chosen were:  Left: Resound Omnia 7 61 rechargable  COLOR: Black  BATTERY SIZE: rechargeable  EARMOLD/TIPS: Encased micro-mold with reinforced  wire (note patient would like to order two molds)  CANAL/ LENGTH: 3 UP    Otoscopy revealed ears are clear of cerumen bilaterally. A left earmold was taken without incident.      COMMENTS: No programming changes were made today due to patient report and speech perception testing results.    SUMMARY AND RECOMMENDATIONS: Vlad was seen for cochlear implant programming, speech perception testing and monaural hearing aid consultation today. He will return in about 3 weeks for hearing aid fitting, sooner per concern. Please call this clinic with questions regarding these results or recommendations.    Joselo Martínez, [facility]  Licensed Audiologist  [place] License #[id number]

## 2022-09-28 ENCOUNTER — TELEPHONE (OUTPATIENT)
Dept: AUDIOLOGY | Facility: CLINIC | Age: 32
End: 2022-09-28

## 2022-09-28 NOTE — TELEPHONE ENCOUNTER
LVM that their provider, Dinah Hope, is unavailable on 9/28   and we need to reschedule. Gave new date and time and call center number.

## 2022-09-28 NOTE — TELEPHONE ENCOUNTER
[facility] Call Center    Phone Message    May a detailed message be left on voicemail: yes     Reason for Call: Other: Pt calling in really upset , being as this is the second time his Fitting appt has been cancelled . He is wondering if he can get in sooner or with someone else . Also states that his preferred method of communication is mychart being as he has a hard time hearing so he doesnt always utilize making/taking phone calls . Please reach out to pt to discuss. Thank you      Action Taken: Other: csc ent    Travel Screening: Not Applicable

## 2022-09-29 ENCOUNTER — OFFICE VISIT (OUTPATIENT)
Dept: AUDIOLOGY | Facility: CLINIC | Age: 32
End: 2022-09-29
Payer: COMMERCIAL

## 2022-09-29 DIAGNOSIS — H90.3 SENSORINEURAL HEARING LOSS, BILATERAL: Primary | ICD-10-CM

## 2022-09-29 PROCEDURE — V5264 EAR MOLD/INSERT: HCPCS | Performed by: AUDIOLOGIST

## 2022-09-29 PROCEDURE — V5241 DISPENSING FEE, MONAURAL: HCPCS | Mod: LT | Performed by: AUDIOLOGIST

## 2022-09-29 PROCEDURE — V5011 HEARING AID FITTING/CHECKING: HCPCS | Performed by: AUDIOLOGIST

## 2022-09-29 PROCEDURE — V5257 HEARING AID, DIGIT, MON, BTE: HCPCS | Mod: LT | Performed by: AUDIOLOGIST

## 2022-09-29 PROCEDURE — V5020 CONFORMITY EVALUATION: HCPCS | Performed by: AUDIOLOGIST

## 2022-09-29 NOTE — PROGRESS NOTES
AUDIOLOGY REPORT    SUBJECTIVE: Vlad Rhoaeds is a 32 year old male who was seen in the Audiology Clinic at [facility] for fitting of a left ReSound Omnia 7 - R hearing aid with acrylic micromold. Previous results have revealed moderate sloping to profound sensorineural hearing loss for the left ear and complete hearing loss for the right ear. He has used amplification continuously from about 3 years of age, reporting that he suffered from severe jaundice shortly after birth, which may be a factor in his hearing loss. He has a diagnosis of cerebral palsy. He was implanted with a right Cochlear "[employer]s  cochlear implant (SN: [id number]) on 5/16/2018 (initial activation on 6/19/2018). He currently uses a N7 sound processor.      OBJECTIVE: The hearing aid conformity evaluation was completed.The left hearing aid was placed and provided a good fit. Simulated real-ear measurements were completed on the ugichem system and were a good match to NAL-NL1 targets with soft sounds audible, moderate sounds comfortable, and loud sounds below discomfort. UCLs are verified through maximum power output measures and demonstrate appropriate limiting of loud inputs. Vlad was oriented to proper hearing aid use, care, cleaning (no water, dry brush), batteries (size: rechargeable, low-battery signal), aid insertion/removal, user booklet, warranty information, storage cases, and other hearing aid details. The patient confirmed understanding of hearing aid use and care and showed proper insertion of the hearing aid while in the office today. Vlad reported good volume and sound quality.   Hearing aids were programmed as follows:  Program 1: All-Around   Program button and volume control were deactivated. The hearing aid and sound processor were unable to be linked in the Cochlear [employer] software at this time. The hearing aid also was unable to be paired with the Medxnote david  today.     Vlad asked about the new wax guard design and wanted extra supplies. He also stated he ordered a different . Kimberlee will be contacted regarding both issues.      EAR FIT: Left  HEARING AID MODEL NAME: ReSound Omnia 7 - R  HEARING AID STYLE: -in-the-ear behind-the-ear  EARMOLDS/TIP: Acrylic micromolds (NOTE: The patient has two micromolds, as requested)  SERIAL NUMBER: [id number]  WARRANTY END DATE: 10/9/2025    ASSESSMENT: A left ReSound Omnia 7 - R hearing aid was fit today. Verification measures were performed. Vlad signed the Hearing Aid Purchase Agreement and was given a copy, as well as details on his hearing aids. The patient was counseled that exact out of pocket amounts cannot be determined for hearing aid claims being sent to insurance. Any insurance coverage information presented to the patient is an estimate only, and is not a guarantee of payment. The patient has been advised to check with their own insurance.    PLAN: Vlad will return for follow-up in 2-3 weeks for a hearing aid review appointment. Will attempt to link the hearing aid and sound processor in the [employer] software again. Will also pair to his Android phone and the david at that time. Will hopefully have the extra wax guards and the correct  as well. Please call this clinic with questions regarding today s appointment.    Yasmin Perales M.A.  Audiology Doctoral Extern  [place] #[id number]    I was present with the patient for the entire Audiology appointment, including all procedures/testing performed by the [place] student, and agree with the student s assessment and plan as documented.    Troy Sutherland, [facility]  Licensed Audiologist  [place] #[id number]

## 2022-10-03 ENCOUNTER — HEALTH MAINTENANCE LETTER (OUTPATIENT)
Age: 32
End: 2022-10-03

## 2022-10-17 ENCOUNTER — TELEPHONE (OUTPATIENT)
Dept: AUDIOLOGY | Facility: CLINIC | Age: 32
End: 2022-10-17

## 2022-10-17 NOTE — TELEPHONE ENCOUNTER
Walk-in hearing aid services on 10/17/22: Cleaned both embedded HAI molds and replaced filters.  Briefly counseled patient on how to use the HF3 filters.  He will review further with his audiologist at his upcoming initial review appointment.

## 2022-11-23 ENCOUNTER — OFFICE VISIT (OUTPATIENT)
Dept: AUDIOLOGY | Facility: CLINIC | Age: 32
End: 2022-11-23
Payer: COMMERCIAL

## 2022-11-23 DIAGNOSIS — H90.3 SENSORINEURAL HEARING LOSS, BILATERAL: Primary | ICD-10-CM

## 2022-11-23 PROCEDURE — 99207 PR ASSESSMENT FOR HEARING AID: CPT | Performed by: AUDIOLOGIST

## 2022-11-23 NOTE — PROGRESS NOTES
AUDIOLOGY REPORT    SUBJECTIVE: Vlad Rhoades is a 32 year old male who was seen in the Audiology Clinic at [facility] on 11/23/2022 for a follow-up check regarding the fitting of a new hearing aid. Previous results have revealed moderate sloping to profound sensorineural hearing loss for the left ear and complete hearing loss for the right ear. He has used amplification continuously from about 3 years of age, reporting that he suffered from severe jaundice shortly after birth, which may be a factor in his hearing loss. He has a diagnosis of cerebral palsy. He was implanted with a right Cochlear [employer]  cochlear implant (SN: [id number]) on 5/16/2018 (initial activation on 6/19/2018). He currently uses a N7 sound processor. The patient was fit with a left ReSound Omnia 7 - R hearing aid with acrylic micromold at this clinic on 9/29/2022. Vlad reports he needs help changing the wax guard. He also notes the high frequencies need to be turned up.    OBJECTIVE: The wax guard was changed. Based on patient report, the following changes were made: High frequency gain was increased by 4 dB.     Reviewed 45 day trial period, care, cleaning (no water, dry brush), batteries (size: rechargeable, low-battery signal), aid insertion/removal, volume adjustment (if applicable), user booklet, warranty information, storage cases, and other hearing aid details.     ASSESSMENT: A follow-up appointment for hearing aid fitting was completed today. Changes made as outlined above. No charge visit, as the hearing aid is in warranty.    PLAN: Vlad will return for follow-up as needed, or at least every 9-12 months for cleaning and assessment of the hearing aid. Please call this clinic with any questions regarding today s appointment.      Yasmin Perales M.A., Audiology Doctoral Student assisted with today's appointment. I was present with the patient for the entire Audiology  appointment, including all procedures/testing performed by the [place] student.      T[place] Sutherland, [facility]  Licensed Audiologist  [place] #[id number]

## 2023-01-23 ENCOUNTER — OFFICE VISIT (OUTPATIENT)
Dept: AUDIOLOGY | Facility: CLINIC | Age: 33
End: 2023-01-23
Payer: COMMERCIAL

## 2023-01-23 DIAGNOSIS — H90.3 SENSORINEURAL HEARING LOSS, ASYMMETRICAL: Primary | ICD-10-CM

## 2023-01-23 NOTE — PROGRESS NOTES
AUDIOLOGY REPORT    SUBJECTIVE: Vlad Rhoades was seen in the Audiology Clinic at [facility] on 1/23/2023 for follow-up.  Patient was implanted by Dr Placido Ball (at [facility]) with a  [employer] implant SN# [id number] on 5/16/18 in his right ear due to a bilateral sensorineural hearing loss and lack of benefit from hearing aids. The patient's initial activation on the right ear was 6/19/2018. He currently uses a [employer] N8 processor. He uses a left ReSound Omnia 7R hearing aid fit on 9/29/2022. He reports that he has used amplification continuously from about 3 years of age, he reports that he suffered from severe Jaundice shortly after birth which may be a factor in his hearing loss. He has a diagnosis of cerebral palsy.    He is here today to get the SCAN 2 Forward Focus feature turned on in his processor and have his hearing aid and processor linked. He also requests the Master volume and Bass/treble be turned back on.    OBJECTIVE:  The patient came to the clinic for adjustment to the programs in the external speech processor and for assessment of the external components of the cochlear implant system. These components provide power and data to the internal device. Sound is only heard once the external portion is activated. Postoperative treatment, including device fitting and adjustment, audiologic assessments, and training are required at regular intervals. Testing can include electropsychophysical measures of threshold, comfort, and loudness balancing, which are completed to update the program.    Processor type: [employer] N8  Magnet strength: 1/2    TEST RESULTS:     Programs:  1. 37 (Scan 2 FF)  2. 37 (SCAN)  3. Map 36 (Music)  4. Empty    Number of Channels per Program: 22    ASSESSMENT: The patient arrived with his new N8 processor. The processor was connected to the computer. The previous MAP was out of compliance and so a new MAP  with wider pulse width was created. He reported good volume and sound quality with the new MAP. The forward focus, master volume, and bass/treble were programmed in.  The ReSound Omnia 7 hearing aid was paired. There was initially an error when trying to complete the linking, however after speaking with an  Audiologist at [facility] a solution was found. The patient confirmed that everything saved correctly and was visible in his david.    Of note, impedence on electrode 1 was higher than it had been previously and will be monitored.    PLAN: Vlad was seen for cochlear implant programming today and linking of the hearing aid for bimodal streaming. He will return 3/22/2023 for programming follow-up or sooner if concerns arise. Please call this clinic with questions regarding these results or recommendations.        Buddy Louise  Audiologist  [place] License  #[id number]        .

## 2023-02-15 ENCOUNTER — DOCUMENTATION ONLY (OUTPATIENT)
Dept: AUDIOLOGY | Facility: CLINIC | Age: 33
End: 2023-02-15
Payer: COMMERCIAL

## 2023-02-15 NOTE — PROGRESS NOTES
CMN signed by Dr. Cheryl Peña and submitted to [employer] for left CI.    -Amara Cali  2/15/23

## 2023-03-22 ENCOUNTER — OFFICE VISIT (OUTPATIENT)
Dept: AUDIOLOGY | Facility: CLINIC | Age: 33
End: 2023-03-22
Payer: COMMERCIAL

## 2023-03-22 DIAGNOSIS — H90.3 SENSORINEURAL HEARING LOSS, ASYMMETRICAL: Primary | ICD-10-CM

## 2023-03-22 PROCEDURE — 92626 EVAL AUD FUNCJ 1ST HOUR: CPT | Mod: 59 | Performed by: AUDIOLOGIST

## 2023-03-22 PROCEDURE — 92604 REPROGRAM COCHLEAR IMPLT 7/>: CPT | Mod: RT | Performed by: AUDIOLOGIST

## 2023-03-22 NOTE — PROGRESS NOTES
AUDIOLOGY REPORT     BACKGROUND INFORMATION: Patient was implanted by Dr Placido Ball (at [facility]) with a  [employer] implant SN# [id number] on 5/16/18 in his right ear due to a bilateral sensorineural hearing loss and lack of benefit from hearing aids. The patient's initial activation on the right ear was 6/19/2018. He currently uses a [employer] N8 processor. He uses a left ReSound Omnia hearing aid. He reports that he has used amplification continuously from about 3 years of age, he reports that he suffered from severe Jaundice shortly after birth which may be a factor in his hearing loss. He has a diagnosis of cerebral palsy.     The patient is being seen for aided testing and programming following the fitting of his new N8 processor.     PATIENT REPORT: He reports that he feels the implant may be a little soft, he inquired about the dynamic range maybe being too small. He also reports that one of his integrated receivers is not functioning well.     METHOD: Speech perception testing is conducted at regular intervals to determine the degree of benefit the patient is obtaining from the cochlear implant. Tests are conducted using the cochlear implant without the benefit of lipreading. All tests are conducted in a sound-treated room. Perception of monosyllabic words and words in sentences are tested. Results usually show improvement over time. A decrease in performance indicates the need for re-programming of the prosthesis and/or replacement of components.      INTERVAL: Almost 5 years      35 minutes were spent assessing the patient s auditory rehabilitation status.     Device(s) used for Testing:   Right ear: N8  Left ear: ReSound Omnia RITE     Soundfield Aided Thresholds: Right aided thresholds in mild hearing loss range. Did not assess aided left thresholds.     CNC Words Test:  The patient repeats 25 single syllable words, auditory only. The words are presented at 60 dB SPL (conversational  level) delivered from a CD player.     4 years Post-Activation of CI: 8/17/22  Right: words: 72%, phonemes: 85%    5 years Post-Activation of CI: (today)  Right: words: 68%     [employer] Sentences Test:  The patient repeats 20 sentences, auditory only.  The sentences are presented at 60 dB SPL (conversational level) delivered from a CD player.      2 years Post-Activation of CI: (8/5/21)  Right: 84%     4 years Post-Activation of CI: 8/17/22  Right: 80%  Bimodal: 96%, +10 dB SNR: 63%    5 years Post-Activation of CI: (today)  Right: 87%  Bimodal: 99%, +10 dB SNR: 83%        FITTING SESSION: Rick Nissen, MD ordered today's appointment. The patient came to the clinic for adjustment to the programs in the external speech processor and for assessment of the external components of the cochlear implant system. These components provide power and data to the internal device. Sound is only heard once the external portion is activated. Postoperative treatment, including device fitting and adjustment, audiologic assessments, and training are required at regular intervals. Testing can include electropsychophysical measures of threshold, comfort, and loudness balancing, which are completed to update the program.     Processor type: [employer] N8  Magnet strength: 1/2     TEST RESULTS:   Otoscopy: Clear canals bilaterally     Electrode Impedances: stable and within tolerances  Neural Response Testing: Present responses across the electrode array.  22 (178), 16 (176), 11 (193), 6 (188), 1 (148).  Facial Stimulation: Absent  Tinnitus: Absent  Balance Problems: Absent  Pain/Discomfort: Absent  Strategies Tried: ACE  Strategy Preference: ACE     Programs: as read out from processor  Programs:  1. 38 (Scan 2 FF)  2. 38 (SCAN)  3. Map 36 (Music)  4. 37 (Scan FF)     Number of Channels per Program: 22         COMMENTS: Aided thresholds and speech perception testing is stable compared to his previous testing with the N7 processor. Ts  "and Cs were re-measured using loudness scaling. The dynamic range significantly improved. Upon going live the patient reported more \"bass\" sounds and was happy with the volume and sound quality. He was given his previous program to switch into should he need it.    The hearing aid was assessed with the different receivers. An electroacoustic check in the [employer] test box showed that they receivers were functioning the same but a listening check indicated that one had a \"blown speaker\" quality to it. This will be sent in for repair and the patient reports he would like it mailed to him when it arrives.       SUMMARY AND RECOMMENDATIONS: Vlad was seen for cochlear implant programming and speech perception testing. It is recommended that he return next year for annual testing, sooner if concerns arise. Patient was in agreement with the plan. Please call this clinic with questions regarding these results or recommendations.    Buddy Louise  Audiologist  [place] License  #[id number]    "

## 2023-06-26 ENCOUNTER — TELEPHONE (OUTPATIENT)
Dept: AUDIOLOGY | Facility: CLINIC | Age: 33
End: 2023-06-26
Payer: COMMERCIAL

## 2023-06-26 NOTE — TELEPHONE ENCOUNTER
Walk-in hearing aid services on 6/26/23: The patient reported his left hearing aid wasn't working well and suspected it was related to the microphone.  Initial examination found the hearing aid was working but amplification was weak.  Using the patient's back-up embedded HAI mold/ did not help.  Deep cleaning the microphone ports did resolve the issue.  A listening check found the hearing aid to be working properly and it was returned to the patient.

## 2023-08-18 ENCOUNTER — OFFICE VISIT (OUTPATIENT)
Dept: AUDIOLOGY | Facility: CLINIC | Age: 33
End: 2023-08-18
Payer: COMMERCIAL

## 2023-08-18 DIAGNOSIS — H90.3 SENSORINEURAL HEARING LOSS, ASYMMETRICAL: Primary | ICD-10-CM

## 2023-08-18 PROCEDURE — 99207 PR ASSESSMENT FOR HEARING AID: CPT | Performed by: AUDIOLOGIST

## 2023-08-18 NOTE — PROGRESS NOTES
AUDIOLOGY REPORT    SUBJECTIVE: Vlad is being seen today on 8/18/2023 in the audiology clinic at the [facility]. Patient was implanted by Dr Placido Ball (at [facility]) with a  [employer] implant SN# [id number] on 5/16/18 in his right ear due to a bilateral sensorineural hearing loss and lack of benefit from hearing aids. The patient's initial activation on the right ear was 6/19/2018. He currently uses a [employer] N8 processor. He uses a left ReSound Omnia hearing aid. He reports that he has used amplification continuously from about 3 years of age, he reports that he suffered from severe Jaundice shortly after birth which may be a factor in his hearing loss. He has a diagnosis of cerebral palsy.       OBJECTIVE:Vlad is here today to re-pair his cochlear implant (N8) with his ReSound hearing aid since it returned from repair.    ASSESSMENT: The N8 processor and ReSound hearing aid were linked in the software. He did not have his iPhone to be able to check and be sure that both were connected. He requested that the 4th program be removed as he does not use it. Lastly he notes that he only finds the Scan FF useful in significant background noise situations.    PLAN: Vlad was seen for cochlear implant programming today. He will return next year for his annual aided testing or sooner if concerns arise. Today's appointment is a no charge visit as it was only for linking the devices and no true programming occurred. Please call this clinic with questions regarding these results or recommendations.        Buddy Louise  Audiologist  [place] License  #[id number]        .

## 2023-10-21 ENCOUNTER — HEALTH MAINTENANCE LETTER (OUTPATIENT)
Age: 33
End: 2023-10-21

## 2023-11-27 ENCOUNTER — TELEPHONE (OUTPATIENT)
Dept: AUDIOLOGY | Facility: CLINIC | Age: 33
End: 2023-11-27
Payer: COMMERCIAL

## 2023-11-27 NOTE — TELEPHONE ENCOUNTER
Walk-in hearing aid services on 11/27/23: The patient reported his left hearing aid wasn't picking up sound well.  Examination confirmed the hearing aid was turning on but not picking up any sound through the microphones.  The back of the hearing aid was removed and the antonio screens were deep cleaned.  Afterwards a listening check found the hearing aid to be working properly and it was returned to the patient.

## 2024-04-19 ENCOUNTER — DOCUMENTATION ONLY (OUTPATIENT)
Dept: AUDIOLOGY | Facility: CLINIC | Age: 34
End: 2024-04-19
Payer: COMMERCIAL

## 2024-04-19 NOTE — PROGRESS NOTES
Walk-in hearing aid services on 4/19/24: The patient reported his left hearing aid wasn't working properly.  Examination found the main microphone was clogged with debris/mositure.  The hearing aid and earmold were deep cleaned and the earmold filter was replaced.  Afterwards a listening check determined the hearing aid was working properly and it was returned to the patient.

## 2024-06-09 ENCOUNTER — DOCUMENTATION ONLY (OUTPATIENT)
Dept: AUDIOLOGY | Facility: CLINIC | Age: 34
End: 2024-06-09
Payer: COMMERCIAL

## 2024-06-10 ENCOUNTER — DOCUMENTATION ONLY (OUTPATIENT)
Dept: AUDIOLOGY | Facility: CLINIC | Age: 34
End: 2024-06-10
Payer: COMMERCIAL

## 2024-06-10 NOTE — PROGRESS NOTES
Walk-in hearing aid services on 6/10/24: The patient stated his left hearing aid suddenly stopped working. Examination confirmed the hearing aid was turning on but not producing any sound. A new  did not resolve the issue. The hearing aid is being sent to the  for warranty repair today. The patient will be contacted via [employer] when the hearing aid is back and available to be picked up.

## 2024-06-19 ENCOUNTER — DOCUMENTATION ONLY (OUTPATIENT)
Dept: AUDIOLOGY | Facility: CLINIC | Age: 34
End: 2024-06-19
Payer: COMMERCIAL

## 2024-06-19 DIAGNOSIS — H90.3 SENSORINEURAL HEARING LOSS, BILATERAL: Primary | ICD-10-CM

## 2024-06-19 PROCEDURE — V5264 EAR MOLD/INSERT: HCPCS | Mod: LT

## 2024-06-19 NOTE — PROGRESS NOTES
Submitted charge for new left encased earmold.    John Desir  Audiology Clinic Assistant      I reviewed the procedures/testing performed by the audiology assistant, and agree with the assessment and plan as documented.      Buddy Louise  Audiologist  [place] License  #[id number]

## 2024-06-19 NOTE — PROGRESS NOTES
The patient dropped off one of his ReSound encased micromolds with a broken shell. The earmold is being sent to the  for remake/repair and the patient will be contacted via [employer] when the replacement earmold has arrived.

## 2024-10-22 ENCOUNTER — DOCUMENTATION ONLY (OUTPATIENT)
Dept: AUDIOLOGY | Facility: CLINIC | Age: 34
End: 2024-10-22
Payer: COMMERCIAL

## 2024-10-22 NOTE — PROGRESS NOTES
The patient dropped off his left ReSound Encased Micromold (#[id number]) at the Audiology Clinic with a broken shell. The earmold is being sent to the  for repair. The patient will be billed $100.00 (if charged) for the new earmold and the patient will be contacted via [employer] to pick it up.

## 2024-11-04 ENCOUNTER — DOCUMENTATION ONLY (OUTPATIENT)
Dept: AUDIOLOGY | Facility: CLINIC | Age: 34
End: 2024-11-04
Payer: COMMERCIAL

## 2024-11-04 DIAGNOSIS — H90.3 SENSORINEURAL HEARING LOSS, BILATERAL: Primary | ICD-10-CM

## 2024-11-04 PROCEDURE — V5264 EAR MOLD/INSERT: HCPCS

## 2024-11-04 NOTE — PROGRESS NOTES
Billed insurance for new left earmold. Sent [employer] message to patient to .

## 2024-12-14 ENCOUNTER — HEALTH MAINTENANCE LETTER (OUTPATIENT)
Age: 34
End: 2024-12-14

## 2025-05-12 ENCOUNTER — DOCUMENTATION ONLY (OUTPATIENT)
Dept: AUDIOLOGY | Facility: CLINIC | Age: 35
End: 2025-05-12
Payer: COMMERCIAL

## 2025-05-12 NOTE — PROGRESS NOTES
Walk-in hearing aid services on 5/12/25: The patient stated his hearing aid didn't seem to be working and one of his encased micromold  wires was broken. The patient's hearing aid was deep cleaned and the earmold filter (HF4?) was replaced. A listening check found the hearing aid to be working properly and it was returned to the patient. The broken encased micromold is being sent to [employer] for repair. The patient will be billed for any repair/remake charges and the earmolds will be mailed to him.

## 2025-05-14 ENCOUNTER — TELEPHONE (OUTPATIENT)
Dept: AUDIOLOGY | Facility: CLINIC | Age: 35
End: 2025-05-14
Payer: COMMERCIAL

## 2025-05-14 NOTE — TELEPHONE ENCOUNTER
Writer called to schedule HAC (ok to use CI slots), HFP and IRP. Could not connect to . Will send [employer]

## 2025-05-21 ENCOUNTER — DOCUMENTATION ONLY (OUTPATIENT)
Dept: AUDIOLOGY | Facility: CLINIC | Age: 35
End: 2025-05-21
Payer: COMMERCIAL

## 2025-07-22 ENCOUNTER — DOCUMENTATION ONLY (OUTPATIENT)
Dept: AUDIOLOGY | Facility: CLINIC | Age: 35
End: 2025-07-22
Payer: COMMERCIAL

## 2025-07-22 NOTE — PROGRESS NOTES
The patient dropped off his left ReSound Encased Micromold at the Audiology Clinic with a broken . It is being sent to the  for warranty repair today and the patient will be contacted via [employer] when it returns.

## 2025-08-06 ENCOUNTER — OFFICE VISIT (OUTPATIENT)
Dept: AUDIOLOGY | Facility: CLINIC | Age: 35
End: 2025-08-06
Payer: COMMERCIAL

## 2025-08-06 DIAGNOSIS — H90.3 SENSORINEURAL HEARING LOSS, BILATERAL: Primary | ICD-10-CM

## 2025-08-28 ENCOUNTER — OFFICE VISIT (OUTPATIENT)
Dept: AUDIOLOGY | Facility: CLINIC | Age: 35
End: 2025-08-28
Payer: COMMERCIAL

## 2025-08-28 DIAGNOSIS — H90.3 SENSORINEURAL HEARING LOSS, BILATERAL: Primary | ICD-10-CM
